# Patient Record
Sex: FEMALE | Race: WHITE | NOT HISPANIC OR LATINO | Employment: FULL TIME | URBAN - METROPOLITAN AREA
[De-identification: names, ages, dates, MRNs, and addresses within clinical notes are randomized per-mention and may not be internally consistent; named-entity substitution may affect disease eponyms.]

---

## 2018-09-18 ENCOUNTER — OFFICE VISIT (OUTPATIENT)
Dept: URGENT CARE | Facility: CLINIC | Age: 18
End: 2018-09-18
Payer: COMMERCIAL

## 2018-09-18 VITALS
HEIGHT: 69 IN | TEMPERATURE: 98.1 F | OXYGEN SATURATION: 99 % | HEART RATE: 82 BPM | WEIGHT: 135 LBS | DIASTOLIC BLOOD PRESSURE: 80 MMHG | SYSTOLIC BLOOD PRESSURE: 118 MMHG | BODY MASS INDEX: 19.99 KG/M2 | RESPIRATION RATE: 16 BRPM

## 2018-09-18 DIAGNOSIS — R22.0 SWELLING OF UPPER LIP: ICD-10-CM

## 2018-09-18 PROCEDURE — 99204 OFFICE O/P NEW MOD 45 MIN: CPT | Performed by: PHYSICIAN ASSISTANT

## 2018-09-18 RX ORDER — VALACYCLOVIR HYDROCHLORIDE 1 G/1
2000 TABLET, FILM COATED ORAL 2 TIMES DAILY
Qty: 4 TAB | Refills: 0 | Status: SHIPPED | OUTPATIENT
Start: 2018-09-18 | End: 2018-09-19

## 2018-09-18 RX ORDER — AMOXICILLIN AND CLAVULANATE POTASSIUM 875; 125 MG/1; MG/1
1 TABLET, FILM COATED ORAL 2 TIMES DAILY
Qty: 14 TAB | Refills: 0 | Status: SHIPPED | OUTPATIENT
Start: 2018-09-18 | End: 2018-09-25

## 2018-09-18 RX ORDER — METHYLPREDNISOLONE 4 MG/1
TABLET ORAL
Qty: 21 TAB | Refills: 0 | Status: SHIPPED | OUTPATIENT
Start: 2018-09-18

## 2018-09-18 ASSESSMENT — ENCOUNTER SYMPTOMS
TINGLING: 0
CHILLS: 0
FOCAL WEAKNESS: 0
NAUSEA: 0
SORE THROAT: 0
SHORTNESS OF BREATH: 0
SENSORY CHANGE: 0
COUGH: 0
VOMITING: 0
FEVER: 0

## 2018-09-18 NOTE — LETTER
September 18, 2018         Patient: Ana Urbina   YOB: 2000   Date of Visit: 9/18/2018           To Whom it May Concern:    Ana Urbina was seen in my clinic on 9/18/2018. She is excused from work today for medical reasons.    If you have any questions or concerns, please don't hesitate to call.        Sincerely,           Caryl Pedersen P.A.-C.  Electronically Signed

## 2018-09-18 NOTE — PROGRESS NOTES
"Subjective:      Ana Urbina is a 18 y.o. female who presents with Oral Swelling ((L) swollen and has never been this bad before and is continueing to get larger )            Patient is here with complaints of swelling of upper lip x 1 day. Patient denies any injury. She reports swelling is localized to left side. She has history of cold sores. No sore prior to swelling. She has developed a sore after the swelling began. There is an open sore. She has no fever or chills. She denies sore throat or lymph node swelling.      Past Medical History:   Diagnosis Date   • FHx: breast cancer     Maternal great aunt       History reviewed. No pertinent surgical history.    History reviewed. No pertinent family history.    No Known Allergies    Medications, Allergies, and current problem list reviewed today in Epic    Review of Systems   Constitutional: Negative for chills, fever and malaise/fatigue.   HENT: Negative for congestion, ear discharge, ear pain and sore throat.         Lip swelling upper lip   Respiratory: Negative for cough and shortness of breath.    Gastrointestinal: Negative for nausea and vomiting.   Neurological: Negative for tingling, sensory change and focal weakness.     All other systems reviewed and are negative.        Objective:     /80   Pulse 82   Temp 36.7 °C (98.1 °F)   Resp 16   Ht 1.753 m (5' 9\")   Wt 61.2 kg (135 lb)   SpO2 99%   BMI 19.94 kg/m²      Physical Exam   Constitutional: She is oriented to person, place, and time. She appears well-developed and well-nourished. No distress.   HENT:   Head: Normocephalic and atraumatic.   Mouth/Throat: Oropharynx is clear and moist and mucous membranes are normal. No tonsillar exudate.       Neck: Neck supple.   Cardiovascular: Normal rate, regular rhythm and normal heart sounds.  Exam reveals no gallop and no friction rub.    No murmur heard.  Pulmonary/Chest: Effort normal. No respiratory distress. She has no wheezes. She has no " rales.   Lymphadenopathy:     She has no cervical adenopathy.   Neurological: She is alert and oriented to person, place, and time. No cranial nerve deficit.   Skin: Skin is warm and dry. No rash noted.   Psychiatric: She has a normal mood and affect. Her behavior is normal. Judgment and thought content normal.               Assessment/Plan:     1. Swelling of upper lip    - valacyclovir (VALTREX) 1 GM Tab; Take 2 Tabs by mouth 2 times a day for 1 day.  Dispense: 4 Tab; Refill: 0  - MethylPREDNISolone (MEDROL DOSEPAK) 4 MG Tablet Therapy Pack; Take as directed on package. 1 pack.  Dispense: 21 Tab; Refill: 0  - amoxicillin-clavulanate (AUGMENTIN) 875-125 MG Tab; Take 1 Tab by mouth 2 times a day for 7 days.  Dispense: 14 Tab; Refill: 0     Differential diagnoses, Supportive care, and indications for immediate follow-up discussed with patient.   Instructed to return to clinic or nearest emergency department for any change in condition, further concerns, or worsening of symptoms.    The patient demonstrated a good understanding and agreed with the treatment plan.    Caryl Pedersen P.A.-C.

## 2019-06-13 ENCOUNTER — OFFICE VISIT (OUTPATIENT)
Dept: URGENT CARE | Facility: CLINIC | Age: 19
End: 2019-06-13

## 2019-06-13 ENCOUNTER — HOSPITAL ENCOUNTER (OUTPATIENT)
Facility: MEDICAL CENTER | Age: 19
End: 2019-06-13
Attending: PHYSICIAN ASSISTANT

## 2019-06-13 VITALS
HEIGHT: 69 IN | RESPIRATION RATE: 16 BRPM | OXYGEN SATURATION: 97 % | DIASTOLIC BLOOD PRESSURE: 70 MMHG | HEART RATE: 127 BPM | WEIGHT: 165 LBS | SYSTOLIC BLOOD PRESSURE: 110 MMHG | BODY MASS INDEX: 24.44 KG/M2 | TEMPERATURE: 100.2 F

## 2019-06-13 DIAGNOSIS — N39.0 URINARY TRACT INFECTION WITH HEMATURIA, SITE UNSPECIFIED: ICD-10-CM

## 2019-06-13 DIAGNOSIS — R50.9 FEVER, UNSPECIFIED FEVER CAUSE: ICD-10-CM

## 2019-06-13 DIAGNOSIS — R52 GENERALIZED BODY ACHES: ICD-10-CM

## 2019-06-13 DIAGNOSIS — R31.9 URINARY TRACT INFECTION WITH HEMATURIA, SITE UNSPECIFIED: ICD-10-CM

## 2019-06-13 LAB
ALBUMIN SERPL BCP-MCNC: 3.6 G/DL (ref 3.2–4.9)
ALBUMIN/GLOB SERPL: 0.9 G/DL
ALP SERPL-CCNC: 85 U/L (ref 30–99)
ALT SERPL-CCNC: 30 U/L (ref 2–50)
ANION GAP SERPL CALC-SCNC: 11 MMOL/L (ref 0–11.9)
APPEARANCE UR: NORMAL
AST SERPL-CCNC: 26 U/L (ref 12–45)
BASOPHILS # BLD AUTO: 0.2 % (ref 0–1.8)
BASOPHILS # BLD: 0.04 K/UL (ref 0–0.12)
BILIRUB SERPL-MCNC: 0.9 MG/DL (ref 0.1–1.5)
BILIRUB UR STRIP-MCNC: NORMAL MG/DL
BUN SERPL-MCNC: 13 MG/DL (ref 8–22)
CALCIUM SERPL-MCNC: 8.9 MG/DL (ref 8.5–10.5)
CHLORIDE SERPL-SCNC: 97 MMOL/L (ref 96–112)
CK SERPL-CCNC: 77 U/L (ref 0–154)
CO2 SERPL-SCNC: 23 MMOL/L (ref 20–33)
COLOR UR AUTO: NORMAL
CREAT SERPL-MCNC: 1.2 MG/DL (ref 0.5–1.4)
EOSINOPHIL # BLD AUTO: 0 K/UL (ref 0–0.51)
EOSINOPHIL NFR BLD: 0 % (ref 0–6.9)
ERYTHROCYTE [DISTWIDTH] IN BLOOD BY AUTOMATED COUNT: 39.7 FL (ref 35.9–50)
GLOBULIN SER CALC-MCNC: 3.8 G/DL (ref 1.9–3.5)
GLUCOSE SERPL-MCNC: 105 MG/DL (ref 65–99)
GLUCOSE UR STRIP.AUTO-MCNC: NEGATIVE MG/DL
HCT VFR BLD AUTO: 36.3 % (ref 37–47)
HGB BLD-MCNC: 12.4 G/DL (ref 12–16)
IMM GRANULOCYTES # BLD AUTO: 0.41 K/UL (ref 0–0.11)
IMM GRANULOCYTES NFR BLD AUTO: 1.9 % (ref 0–0.9)
INT CON NEG: NORMAL
INT CON POS: NORMAL
KETONES UR STRIP.AUTO-MCNC: 80 MG/DL
LEUKOCYTE ESTERASE UR QL STRIP.AUTO: NORMAL
LYMPHOCYTES # BLD AUTO: 1.34 K/UL (ref 1–4.8)
LYMPHOCYTES NFR BLD: 6.3 % (ref 22–41)
MCH RBC QN AUTO: 29.5 PG (ref 27–33)
MCHC RBC AUTO-ENTMCNC: 34.2 G/DL (ref 33.6–35)
MCV RBC AUTO: 86.2 FL (ref 81.4–97.8)
MONOCYTES # BLD AUTO: 1.72 K/UL (ref 0–0.85)
MONOCYTES NFR BLD AUTO: 8.1 % (ref 0–13.4)
NEUTROPHILS # BLD AUTO: 17.69 K/UL (ref 2–7.15)
NEUTROPHILS NFR BLD: 83.5 % (ref 44–72)
NITRITE UR QL STRIP.AUTO: NEGATIVE
NRBC # BLD AUTO: 0 K/UL
NRBC BLD-RTO: 0 /100 WBC
PH UR STRIP.AUTO: 6 [PH] (ref 5–8)
PLATELET # BLD AUTO: 103 K/UL (ref 164–446)
PMV BLD AUTO: 10.9 FL (ref 9–12.9)
POC URINE PREGNANCY TEST: NEGATIVE
POTASSIUM SERPL-SCNC: 3.1 MMOL/L (ref 3.6–5.5)
PROT SERPL-MCNC: 7.4 G/DL (ref 6–8.2)
PROT UR QL STRIP: 100 MG/DL
RBC # BLD AUTO: 4.21 M/UL (ref 4.2–5.4)
RBC UR QL AUTO: NORMAL
SODIUM SERPL-SCNC: 131 MMOL/L (ref 135–145)
SP GR UR STRIP.AUTO: 1.01
UROBILINOGEN UR STRIP-MCNC: 1 MG/DL
WBC # BLD AUTO: 21.2 K/UL (ref 4.8–10.8)

## 2019-06-13 PROCEDURE — 85025 COMPLETE CBC W/AUTO DIFF WBC: CPT

## 2019-06-13 PROCEDURE — 87077 CULTURE AEROBIC IDENTIFY: CPT

## 2019-06-13 PROCEDURE — 80053 COMPREHEN METABOLIC PANEL: CPT

## 2019-06-13 PROCEDURE — 81025 URINE PREGNANCY TEST: CPT | Performed by: PHYSICIAN ASSISTANT

## 2019-06-13 PROCEDURE — 87086 URINE CULTURE/COLONY COUNT: CPT

## 2019-06-13 PROCEDURE — 87186 SC STD MICRODIL/AGAR DIL: CPT

## 2019-06-13 PROCEDURE — 99214 OFFICE O/P EST MOD 30 MIN: CPT | Mod: 25 | Performed by: PHYSICIAN ASSISTANT

## 2019-06-13 PROCEDURE — 81002 URINALYSIS NONAUTO W/O SCOPE: CPT | Performed by: PHYSICIAN ASSISTANT

## 2019-06-13 PROCEDURE — 82550 ASSAY OF CK (CPK): CPT

## 2019-06-13 RX ORDER — SULFAMETHOXAZOLE AND TRIMETHOPRIM 800; 160 MG/1; MG/1
1 TABLET ORAL EVERY 12 HOURS
Qty: 28 TAB | Refills: 0 | Status: SHIPPED | OUTPATIENT
Start: 2019-06-13 | End: 2019-06-27

## 2019-06-13 ASSESSMENT — ENCOUNTER SYMPTOMS
EYE DISCHARGE: 0
COUGH: 1
VOMITING: 1
FEVER: 1
MYALGIAS: 1
SORE THROAT: 0
ABDOMINAL PAIN: 1
HEADACHES: 1
NAUSEA: 1
EYE REDNESS: 0
NUMBER OF EPISODES OF EMESIS TODAY: 1
DIARRHEA: 0

## 2019-06-13 NOTE — PROGRESS NOTES
Subjective:      Ana Urbina is a 19 y.o. female who presents with Other (ate shrooms last friday,vomiting,sore,dehydrated,fever )          Emesis   This is a new problem. Episode onset: x 6 days ago. The problem occurs intermittently. The problem has been gradually worsening. Associated symptoms include abdominal pain, coughing, a fever, headaches, myalgias, nausea and vomiting. Pertinent negatives include no chest pain, congestion, rash, sore throat or urinary symptoms. She has tried acetaminophen for the symptoms.      The patient presents to clinic c/o vomiting x 6 days. The patient states she was at a music festival last Friday, where she took psychedelic mushrooms. The patient developed symptoms approx. 5-6 hours after taking the mushrooms. The patient reports associated intermittent fever. She also reports muscle aches and mild headache. The patient states she feels dehydrated. She states she is only able to keep small amount of water down secondary to nausea and vomiting. The patient states she has mild abdominal pain secondary to vomiting. The patient denies flank pain, diarrhea, dysuria, rash, chest pain shortness of breath, congestion, neck pain, vision changes, and numbness, tingling, and weakness to her extremities.        PMH:  has a past medical history of FHx: breast cancer.  MEDS:   Current Outpatient Prescriptions:   •  MethylPREDNISolone (MEDROL DOSEPAK) 4 MG Tablet Therapy Pack, Take as directed on package. 1 pack., Disp: 21 Tab, Rfl: 0  •  Esomeprazole Magnesium (NEXIUM) 10 MG PACK, Take 10 mg by mouth every day., Disp: 30 Each, Rfl: 5  •  albuterol (PROVENTIL) 90 MCG/ACT AERS, Inhale 2 Puffs by mouth every four hours as needed for Shortness of Breath., Disp: 1 Inhaler, Rfl: 3  ALLERGIES: No Known Allergies  SURGHX: History reviewed. No pertinent surgical history.  SOCHX:  reports that she has never smoked. She has never used smokeless tobacco. She reports that she does not drink alcohol  "or use drugs.  FH: Family history was reviewed, no pertinent findings to report      Review of Systems   Constitutional: Positive for fever.   HENT: Negative for congestion, ear pain and sore throat.    Eyes: Negative for discharge and redness.   Respiratory: Positive for cough.    Cardiovascular: Negative for chest pain and leg swelling.   Gastrointestinal: Positive for abdominal pain, nausea and vomiting. Negative for diarrhea.   Genitourinary: Negative for dysuria.   Musculoskeletal: Positive for myalgias.   Skin: Negative for rash.   Neurological: Positive for headaches.          Objective:     /70 (BP Location: Right arm, Patient Position: Sitting)   Pulse (!) 127   Temp 37.9 °C (100.2 °F) (Temporal)   Resp 16   Ht 1.753 m (5' 9\")   Wt 74.8 kg (165 lb)   SpO2 97%   BMI 24.37 kg/m²      Physical Exam   Constitutional: She is oriented to person, place, and time. She appears well-developed and well-nourished. No distress.   HENT:   Head: Normocephalic and atraumatic.   Right Ear: Tympanic membrane, external ear and ear canal normal.   Left Ear: Tympanic membrane, external ear and ear canal normal.   Nose: Nose normal.   Mouth/Throat: Oropharynx is clear and moist and mucous membranes are normal. No posterior oropharyngeal erythema.   Eyes: Conjunctivae and EOM are normal.   Neck: Normal range of motion. Neck supple. No neck rigidity.   Cardiovascular: Regular rhythm and normal heart sounds.  Tachycardia present.    Pulmonary/Chest: Effort normal and breath sounds normal.   Abdominal: Soft. Bowel sounds are normal. There is generalized tenderness. There is CVA tenderness.   Musculoskeletal: Normal range of motion.   Neurological: She is alert and oriented to person, place, and time.   Skin: Skin is warm and dry.           Progress:  Results for orders placed or performed in visit on 06/13/19   POCT Urinalysis   Result Value Ref Range    POC Color Marlyn Negative    POC Appearance Cloudy Negative    POC " Leukocyte Esterase Small Negative    POC Nitrites Negative Negative    POC Urobiligen 1.0 Negative (0.2) mg/dL    POC Protein 100 Negative mg/dL    POC Urine PH 6.0 5.0 - 8.0    POC Blood Moderate Negative    POC Specific Gravity 1.010 <1.005 - >1.030    POC Ketones 80 Negative mg/dL    POC Bilirubin Moderate Negative mg/dL    POC Glucose Negative Negative mg/dL   POCT Pregnancy   Result Value Ref Range    POC Urine Pregnancy Test Negative Negative    Internal Control Positive Valid     Internal Control Negative Valid      Urine Culture - pending     Ceftriaxone 1g given in clinic.    CBC:  Component Value Ref Range & Units Status   WBC 21.2   4.8 - 10.8 K/uL Final   RBC 4.21  4.20 - 5.40 M/uL Final   Hemoglobin 12.4  12.0 - 16.0 g/dL Final   Hematocrit 36.3   37.0 - 47.0 % Final   MCV 86.2  81.4 - 97.8 fL Final   MCH 29.5  27.0 - 33.0 pg Final   MCHC 34.2  33.6 - 35.0 g/dL Final   RDW 39.7  35.9 - 50.0 fL Final   Platelet Count 103   164 - 446 K/uL Final   MPV 10.9  9.0 - 12.9 fL Final   Neutrophils-Polys 83.50   44.00 - 72.00 % Final   Lymphocytes 6.30   22.00 - 41.00 % Final   Monocytes 8.10  0.00 - 13.40 % Final   Eosinophils 0.00  0.00 - 6.90 % Final   Basophils 0.20  0.00 - 1.80 % Final   Immature Granulocytes 1.90   0.00 - 0.90 % Final   Nucleated RBC 0.00  /100 WBC Final   Neutrophils (Absolute) 17.69   2.00 - 7.15 K/uL Final   Comment:   Includes immature neutrophils, if present.   Lymphs (Absolute) 1.34  1.00 - 4.80 K/uL Final   Monos (Absolute) 1.72   0.00 - 0.85 K/uL Final   Eos (Absolute) 0.00  0.00 - 0.51 K/uL Final   Baso (Absolute) 0.04  0.00 - 0.12 K/uL Final   Immature Granulocytes (abs) 0.41   0.00 - 0.11 K/uL Final   NRBC (Absolute) 0.00  K/uL Final     Estimated GFR:    Component Value Ref Range & Units Status   GFR If African American >60  >60 mL/min/1.73 m 2 Final   GFR If Non African American 57   >60 mL/min/1.73 m 2 Final     CMP:  Component Value Ref Range & Units Status   Sodium 131   135  - 145 mmol/L Final   Potassium 3.1   3.6 - 5.5 mmol/L Final   Chloride 97  96 - 112 mmol/L Final   Co2 23  20 - 33 mmol/L Final   Anion Gap 11.0  0.0 - 11.9 Final   Glucose 105   65 - 99 mg/dL Final   Bun 13  8 - 22 mg/dL Final   Creatinine 1.20  0.50 - 1.40 mg/dL Final   Calcium 8.9  8.5 - 10.5 mg/dL Final   AST(SGOT) 26  12 - 45 U/L Final   ALT(SGPT) 30  2 - 50 U/L Final   Alkaline Phosphatase 85  30 - 99 U/L Final   Total Bilirubin 0.9  0.1 - 1.5 mg/dL Final   Albumin 3.6  3.2 - 4.9 g/dL Final   Total Protein 7.4  6.0 - 8.2 g/dL Final   Globulin 3.8   1.9 - 3.5 g/dL Final   A-G Ratio 0.9  g/dL Final     CPK:  Component Value Ref Range & Units Status   CPK Total 77  0 - 154 U/L Final       Called the patient with the results of her laboratory tests. Given the multiple abnormalities to her CBC and CMP with her current symptoms, I recommend the patient go to the ED immediately for further evaluation and treatment of her symptoms.      Assessment/Plan:     1. Fever, unspecified fever cause    - POCT Urinalysis  - URINE CULTURE(NEW); Future  - CBC WITH DIFFERENTIAL; Future  - Comp Metabolic Panel; Future  - CREATINE KINASE; Future  - cefTRIAXone (ROCEPHIN) 1 g, lidocaine (XYLOCAINE) 1 % 3.6 mL for IM use; 1 g by Intramuscular route Once.    2. Urinary tract infection with hematuria, site unspecified    - URINE CULTURE(NEW); Future  - POCT Pregnancy  - cefTRIAXone (ROCEPHIN) 1 g, lidocaine (XYLOCAINE) 1 % 3.6 mL for IM use; 1 g by Intramuscular route Once.  - sulfamethoxazole-trimethoprim (BACTRIM DS) 800-160 MG tablet; Take 1 Tab by mouth every 12 hours for 14 days.  Dispense: 28 Tab; Refill: 0    3. Generalized body aches    - CBC WITH DIFFERENTIAL; Future  - Comp Metabolic Panel; Future  - CREATINE KINASE; Future    The cause of the patient's symptoms is unknown at this time. The patient reports nausea/vomiting with associated fever and muscle aches for the past 6 days after eating psychedelic mushrooms. Given the  patient's fever, a POCT urinalysis was obtained. The patient's urinalysis today in clinic was positive for trace amount of leukocyte esterase with moderate amount of blood. The patient's urinalysis also showed protein, ketones, and bilirubin. Will culture the patient's urine to identify a possible bacterial source. Given the patient's current fever, and CVA tenderness on physical exam, as well as her urinalysis results, Rocephin 1g was administered in clinic for the patient's symptoms. Will also prescribe the patient an oral antibiotic for a presumed urinary tract infection. Additionally, given the patient's persistent nausea/vomiting and fever, laboratory tests were ordered to further assess the patient's symptoms. The patient's CBC and CMP showed abnormalities. The patient's CBC was significant for an elevated WBC count of 21.2 with an absolute neutrophil count of 17.69. The patient's CBC was also significant for a decreased platelet count. The patient's CMP showed decreased levels of Sodium at 131 and Potassium at 3.1. The patient's estimated GFR was calculated at 57. The patient's CPK was within normal limits. The patient was called immediately with the results of her laboratory tests. Given the multiple abnormalities of her CBC and CMP with her current symptoms, I recommended the patient go to the ED for further evaluation and treatment of her symptoms. I stressed the importance of further evaluation in the ED given the patient's symptoms and laboratory results. The patient verbalized understanding. She agrees to go to the ED at this time. I informed the patient of the associated risks of not seeking further care for her symptoms, and again the patient verbalized understanding.     Plan:  Report to the ED immediately for further evaluation and treatment.

## 2019-06-16 LAB
BACTERIA UR CULT: ABNORMAL
BACTERIA UR CULT: ABNORMAL
SIGNIFICANT IND 70042: ABNORMAL
SITE SITE: ABNORMAL
SOURCE SOURCE: ABNORMAL

## 2024-07-16 ENCOUNTER — OFFICE VISIT (OUTPATIENT)
Dept: URGENT CARE | Facility: PHYSICIAN GROUP | Age: 24
End: 2024-07-16
Payer: COMMERCIAL

## 2024-07-16 VITALS
WEIGHT: 173 LBS | HEIGHT: 68 IN | SYSTOLIC BLOOD PRESSURE: 124 MMHG | BODY MASS INDEX: 26.22 KG/M2 | DIASTOLIC BLOOD PRESSURE: 66 MMHG | OXYGEN SATURATION: 98 % | TEMPERATURE: 98.9 F | HEART RATE: 80 BPM | RESPIRATION RATE: 16 BRPM

## 2024-07-16 DIAGNOSIS — K52.9 GASTROENTERITIS: ICD-10-CM

## 2024-07-16 PROCEDURE — 3078F DIAST BP <80 MM HG: CPT | Performed by: FAMILY MEDICINE

## 2024-07-16 PROCEDURE — 99203 OFFICE O/P NEW LOW 30 MIN: CPT | Performed by: FAMILY MEDICINE

## 2024-07-16 PROCEDURE — 3074F SYST BP LT 130 MM HG: CPT | Performed by: FAMILY MEDICINE
